# Patient Record
Sex: MALE | Race: WHITE | NOT HISPANIC OR LATINO | Employment: FULL TIME | URBAN - METROPOLITAN AREA
[De-identification: names, ages, dates, MRNs, and addresses within clinical notes are randomized per-mention and may not be internally consistent; named-entity substitution may affect disease eponyms.]

---

## 2022-02-15 ENCOUNTER — OFFICE VISIT (OUTPATIENT)
Dept: FAMILY MEDICINE | Facility: CLINIC | Age: 67
End: 2022-02-15
Payer: COMMERCIAL

## 2022-02-15 VITALS
SYSTOLIC BLOOD PRESSURE: 151 MMHG | HEART RATE: 84 BPM | DIASTOLIC BLOOD PRESSURE: 88 MMHG | OXYGEN SATURATION: 96 % | TEMPERATURE: 98.6 F | WEIGHT: 274 LBS | RESPIRATION RATE: 16 BRPM | BODY MASS INDEX: 37.16 KG/M2

## 2022-02-15 DIAGNOSIS — Z87.19 HISTORY OF DIVERTICULITIS: ICD-10-CM

## 2022-02-15 DIAGNOSIS — R10.32 ABDOMINAL PAIN, LEFT LOWER QUADRANT: Primary | ICD-10-CM

## 2022-02-15 LAB
BASOPHILS # BLD AUTO: 0.1 10E3/UL (ref 0–0.2)
BASOPHILS NFR BLD AUTO: 1 %
EOSINOPHIL # BLD AUTO: 0.2 10E3/UL (ref 0–0.7)
EOSINOPHIL NFR BLD AUTO: 2 %
ERYTHROCYTE [DISTWIDTH] IN BLOOD BY AUTOMATED COUNT: 13.1 % (ref 10–15)
HCT VFR BLD AUTO: 43.9 % (ref 40–53)
HGB BLD-MCNC: 14.4 G/DL (ref 13.3–17.7)
IMM GRANULOCYTES # BLD: 0 10E3/UL
IMM GRANULOCYTES NFR BLD: 0 %
LYMPHOCYTES # BLD AUTO: 2.1 10E3/UL (ref 0.8–5.3)
LYMPHOCYTES NFR BLD AUTO: 17 %
MCH RBC QN AUTO: 29.6 PG (ref 26.5–33)
MCHC RBC AUTO-ENTMCNC: 32.8 G/DL (ref 31.5–36.5)
MCV RBC AUTO: 90 FL (ref 78–100)
MONOCYTES # BLD AUTO: 0.9 10E3/UL (ref 0–1.3)
MONOCYTES NFR BLD AUTO: 8 %
NEUTROPHILS # BLD AUTO: 9 10E3/UL (ref 1.6–8.3)
NEUTROPHILS NFR BLD AUTO: 73 %
PLATELET # BLD AUTO: 300 10E3/UL (ref 150–450)
RBC # BLD AUTO: 4.86 10E6/UL (ref 4.4–5.9)
WBC # BLD AUTO: 12.3 10E3/UL (ref 4–11)

## 2022-02-15 PROCEDURE — 36415 COLL VENOUS BLD VENIPUNCTURE: CPT | Performed by: PHYSICIAN ASSISTANT

## 2022-02-15 PROCEDURE — 99213 OFFICE O/P EST LOW 20 MIN: CPT | Performed by: PHYSICIAN ASSISTANT

## 2022-02-15 PROCEDURE — 85025 COMPLETE CBC W/AUTO DIFF WBC: CPT | Performed by: PHYSICIAN ASSISTANT

## 2022-02-15 RX ORDER — LISINOPRIL 40 MG/1
TABLET ORAL
COMMUNITY
Start: 2022-02-01

## 2022-02-15 RX ORDER — AMLODIPINE BESYLATE 5 MG/1
5 TABLET ORAL
COMMUNITY
Start: 2021-10-11

## 2022-02-15 RX ORDER — ATORVASTATIN CALCIUM 40 MG/1
TABLET, FILM COATED ORAL
COMMUNITY
Start: 2022-02-01

## 2022-02-15 NOTE — PATIENT INSTRUCTIONS
Take the antibiotic as written.  Monitor your symptoms over the next 48 hours  If you have worsening symptoms to include increase left lower quadrant abdominal pain, fever, vomiting, diarrhea or blood or mucus in the stool return to the emergency room if it is after hours or follow-up immediately for evaluation and treatment.      Patient Education     Discharge Instructions for Diverticulitis   You have been diagnosed with diverticulitis. This is a condition in which small pouches form in your colon (large intestine) and become inflamed or infected. Follow the guidelines below for home care.  As you recover  Tips for recovery include:    Eat a low-fiber diet at first while you recover. Your healthcare provider may advise a liquid diet. This gives your bowel a chance to rest so that it can recover.    Foods to include: flake cereal, mashed potatoes, pancakes, waffles, pasta, white bread, rice, applesauce, bananas, eggs, fish, poultry, tofu, and well-cooked vegetables    Take your medicines as directed. Don't stop taking the medicines, even if you feel better.    Monitor your temperature and report any rise in temperature to your healthcare provider.    Take antibiotics exactly as directed. Don't miss any and keep taking them even if you feel better.     Drink 6 to 8 glasses of water every day, unless told otherwise.    Use a heating pad or hot water bottle to reduce abdominal cramping or pain.  Preventing diverticulitis in the future  Tips for prevention include:    Eat a high-fiber diet. Fiber adds bulk to the stool so that it passes through the large intestine more easily.    Keep drinking 6 to 8 glasses of water every day, unless told otherwise.    Start an exercise program. Ask your healthcare provider how to get started. You can benefit from simple activities such as walking or gardening.    Treat diarrhea with a bland diet. Start with liquids only, then slowly add fiber over time.    Watch for changes in your  bowel movements (constipation to diarrhea).    Prevent constipation with fiber and add a stool softener if needed.     Get plenty of rest and sleep.  Follow-up care  Make a follow-up appointment, or as advised. Your provider may recommend a colonoscopy or other imaging test of your colon.  When to call your healthcare provider  Call your healthcare provider immediately if you have any of the following:    Fever of 100.4 F (38.0 C) or higher, or as directed by your healthcare provider    Chills    Severe cramps in the belly, most commonly the lower left side    Tenderness in the belly, most commonly the lower left side    Nausea and vomiting    Bleeding from your rectum  [employer] last reviewed this educational content on 6/1/2019 2000-2021 [employer]. All rights reserved. This information is not intended as a substitute for professional medical care. Always follow your healthcare professional's instructions.           Patient Education     Diet for Diverticular Disease  What is diverticular disease?   When the inner wall of your colon weakens and forms small pouches, you have diverticulosis. For most people, this causes no symptoms.   If the pouches become inflamed or infected, you have diverticulitis. Warning signs may include pain in the lower abdomen, chills and vomiting (throwing up).  These conditions are called diverticular disease.  What causes it?  The cause has been linked to many years of eating too little fiber. People who eat plenty of whole grains, fresh fruits and vegetables are less likely to get this disease.   Once the pouches have formed, there is no way to reverse them.   How much fiber should I get?  If you're healing from diverticulitis or surgery to remove the inflamed area, you will at first follow a low-fiber diet (less than 10 grams each day). Then, as your doctor advises, you may slowly add fiber. Increase your intake by 1 to 2 grams a day. Your goal will be 25 to 30 grams  a day.   To avoid future problems, continue to get 25 to 30 grams of fiber each day.   How can fiber help?   A high-fiber diet will help you have regular bowel movements. Fiber adds bulk to the stool and helps it pass more easily. Fiber also helps prevent the pouches from growing larger or getting infected.  In the past, doctors have warned patients to avoid eating nuts, seeds and popcorn. They believed these foods could get caught in the pouches and inflame them. But recent studies do not support this idea.   Please ask your dietitian for the handout Fiber Content in Common Foods. It will show you how to get more fiber in your diet.  For informational purposes only. Not to replace the advice of your health care provider. Copyright   2007 [employer]. All rights reserved. Clinically reviewed by [employer]. [place] [id number] - REV 09/19.

## 2022-02-15 NOTE — PROGRESS NOTES
Patient presents with:  Abdominal Pain: possible diverticulitis      Clinical Decision Making:  A conversation with patient stating I do think that he has a history of diverticulitis.  He has not had current diarrhea or fever but he has had left lower quadrant abdominal pain.  Pain is nonsevere and is a 2 out of 10 and does not wake him up at night.  Patient's last bowel movement was soft and formed and there was no blood or mucus in the stools.  Patient declined CT scan for the abdomen but a CBC was obtained looking for a white count.  Patient be treated empirically with Augmentin with indication for return to clinic.  He will follow up with his primary care provider in 1 week for reevaluation and treatment.  Questions were answered patient satisfaction before discharge.      ICD-10-CM    1. Abdominal pain, left lower quadrant  R10.32 amoxicillin-clavulanate (AUGMENTIN) 875-125 MG tablet   2. History of diverticulitis  Z87.19 amoxicillin-clavulanate (AUGMENTIN) 875-125 MG tablet       Patient Instructions   Take the antibiotic as written.  Monitor your symptoms over the next 48 hours  If you have worsening symptoms to include increase left lower quadrant abdominal pain, fever, vomiting, diarrhea or blood or mucus in the stool return to the emergency room if it is after hours or follow-up immediately for evaluation and treatment.      Patient Education     Discharge Instructions for Diverticulitis   You have been diagnosed with diverticulitis. This is a condition in which small pouches form in your colon (large intestine) and become inflamed or infected. Follow the guidelines below for home care.  As you recover  Tips for recovery include:    Eat a low-fiber diet at first while you recover. Your healthcare provider may advise a liquid diet. This gives your bowel a chance to rest so that it can recover.    Foods to include: flake cereal, mashed potatoes, pancakes, waffles, pasta, white bread, rice, applesauce,  bananas, eggs, fish, poultry, tofu, and well-cooked vegetables    Take your medicines as directed. Don't stop taking the medicines, even if you feel better.    Monitor your temperature and report any rise in temperature to your healthcare provider.    Take antibiotics exactly as directed. Don't miss any and keep taking them even if you feel better.     Drink 6 to 8 glasses of water every day, unless told otherwise.    Use a heating pad or hot water bottle to reduce abdominal cramping or pain.  Preventing diverticulitis in the future  Tips for prevention include:    Eat a high-fiber diet. Fiber adds bulk to the stool so that it passes through the large intestine more easily.    Keep drinking 6 to 8 glasses of water every day, unless told otherwise.    Start an exercise program. Ask your healthcare provider how to get started. You can benefit from simple activities such as walking or gardening.    Treat diarrhea with a bland diet. Start with liquids only, then slowly add fiber over time.    Watch for changes in your bowel movements (constipation to diarrhea).    Prevent constipation with fiber and add a stool softener if needed.     Get plenty of rest and sleep.  Follow-up care  Make a follow-up appointment, or as advised. Your provider may recommend a colonoscopy or other imaging test of your colon.  When to call your healthcare provider  Call your healthcare provider immediately if you have any of the following:    Fever of 100.4 F (38.0 C) or higher, or as directed by your healthcare provider    Chills    Severe cramps in the belly, most commonly the lower left side    Tenderness in the belly, most commonly the lower left side    Nausea and vomiting    Bleeding from your rectum  [facility] last reviewed this educational content on 6/1/2019 2000-2021 [employer]. All rights reserved. This information is not intended as a substitute for professional medical care. Always follow your healthcare  professional's instructions.           Patient Education     Diet for Diverticular Disease  What is diverticular disease?   When the inner wall of your colon weakens and forms small pouches, you have diverticulosis. For most people, this causes no symptoms.   If the pouches become inflamed or infected, you have diverticulitis. Warning signs may include pain in the lower abdomen, chills and vomiting (throwing up).  These conditions are called diverticular disease.  What causes it?  The cause has been linked to many years of eating too little fiber. People who eat plenty of whole grains, fresh fruits and vegetables are less likely to get this disease.   Once the pouches have formed, there is no way to reverse them.   How much fiber should I get?  If you're healing from diverticulitis or surgery to remove the inflamed area, you will at first follow a low-fiber diet (less than 10 grams each day). Then, as your doctor advises, you may slowly add fiber. Increase your intake by 1 to 2 grams a day. Your goal will be 25 to 30 grams a day.   To avoid future problems, continue to get 25 to 30 grams of fiber each day.   How can fiber help?   A high-fiber diet will help you have regular bowel movements. Fiber adds bulk to the stool and helps it pass more easily. Fiber also helps prevent the pouches from growing larger or getting infected.  In the past, doctors have warned patients to avoid eating nuts, seeds and popcorn. They believed these foods could get caught in the pouches and inflame them. But recent studies do not support this idea.   Please ask your dietitian for the handout Fiber Content in Common Foods. It will show you how to get more fiber in your diet.  For informational purposes only. Not to replace the advice of your health care provider. Copyright   2007 [facility]. All rights reserved. Clinically reviewed by [employer]. [place] [id number] - REV 09/19.               HPI:  Neel Morales is a  66 year old male who presents today for a 3-day history of left lower quadrant abdominal pain.  Patient did not have blood or mucus in the stools no diarrheal stools but has had soft and formed stools.  Pain has been over the last 3 days but has not kept him awake at night and is only a 2 out of 10 pain.  He has not had associated vomiting or loss of appetite.  He has been eating normally and eliminating well.  She has had a history of diverticulitis and would like to have treatment for his symptoms.     History obtained from chart review and the patient.    Problem List:  There are no relevant problems documented for this patient.      No past medical history on file.    Social History     Tobacco Use     Smoking status: Current Every Day Smoker     Packs/day: 1.00     Years: 35.00     Pack years: 35.00     Types: Cigarettes     Smokeless tobacco: Never Used   Substance Use Topics     Alcohol use: Not on file       Review of Systems  As above in HPI otherwise negative.    Vitals:    02/15/22 1540   BP: (!) 151/88   BP Location: Left arm   Patient Position: Sitting   Cuff Size: Adult Large   Pulse: 84   Resp: 16   Temp: 98.6  F (37  C)   TempSrc: Oral   SpO2: 96%   Weight: 124.3 kg (274 lb)       General: Patient is resting comfortably no acute distress is afebrile  HEENT: Head is normocephalic atraumatic   eyes are PERRL EOMI sclera anicteric   TMs are clear bilaterally  Throat is with mild pharyngeal wall erythema and no exudate  No cervical lymphadenopathy present  LUNGS: Clear to auscultation bilaterally  HEART: Regular rate and rhythm  Abdomen: Left lower quadrant abdominal pain to palpation.  Negative pain at McBurney's point.  Negative Rovsing sign.  Skin: Without rash non-diaphoretic    Physical Exam    Labs:  CBC is pending.    Radiology:  CT of the abdomen was declined by the patient.    At the end of the encounter, I discussed results, diagnosis, medications. Discussed red flags for immediate return to  clinic/ER, as well as indications for follow up if no improvement. Patient understood and agreed to plan. Patient was stable for discharge.

## 2022-10-12 ENCOUNTER — OFFICE VISIT (OUTPATIENT)
Dept: FAMILY MEDICINE | Facility: CLINIC | Age: 67
End: 2022-10-12
Payer: COMMERCIAL

## 2022-10-12 VITALS
BODY MASS INDEX: 35.4 KG/M2 | HEART RATE: 107 BPM | WEIGHT: 261 LBS | TEMPERATURE: 98.6 F | RESPIRATION RATE: 18 BRPM | DIASTOLIC BLOOD PRESSURE: 87 MMHG | SYSTOLIC BLOOD PRESSURE: 143 MMHG | OXYGEN SATURATION: 97 %

## 2022-10-12 DIAGNOSIS — J10.1 INFLUENZA B: Primary | ICD-10-CM

## 2022-10-12 LAB
DEPRECATED S PYO AG THROAT QL EIA: NEGATIVE
FLUAV AG SPEC QL IA: NEGATIVE
FLUBV AG SPEC QL IA: POSITIVE
GROUP A STREP BY PCR: NOT DETECTED
SARS-COV-2 RNA RESP QL NAA+PROBE: NEGATIVE

## 2022-10-12 PROCEDURE — 87651 STREP A DNA AMP PROBE: CPT | Performed by: PHYSICIAN ASSISTANT

## 2022-10-12 PROCEDURE — U0003 INFECTIOUS AGENT DETECTION BY NUCLEIC ACID (DNA OR RNA); SEVERE ACUTE RESPIRATORY SYNDROME CORONAVIRUS 2 (SARS-COV-2) (CORONAVIRUS DISEASE [COVID-19]), AMPLIFIED PROBE TECHNIQUE, MAKING USE OF HIGH THROUGHPUT TECHNOLOGIES AS DESCRIBED BY CMS-2020-01-R: HCPCS | Performed by: PHYSICIAN ASSISTANT

## 2022-10-12 PROCEDURE — U0005 INFEC AGEN DETEC AMPLI PROBE: HCPCS | Performed by: PHYSICIAN ASSISTANT

## 2022-10-12 PROCEDURE — 99213 OFFICE O/P EST LOW 20 MIN: CPT | Mod: CS | Performed by: PHYSICIAN ASSISTANT

## 2022-10-12 PROCEDURE — 87804 INFLUENZA ASSAY W/OPTIC: CPT | Performed by: PHYSICIAN ASSISTANT

## 2022-10-12 RX ORDER — ONDANSETRON 4 MG/1
4 TABLET, ORALLY DISINTEGRATING ORAL EVERY 8 HOURS PRN
Qty: 10 TABLET | Refills: 0 | Status: SHIPPED | OUTPATIENT
Start: 2022-10-12

## 2022-10-12 RX ORDER — OSELTAMIVIR PHOSPHATE 75 MG/1
75 CAPSULE ORAL 2 TIMES DAILY
Qty: 10 CAPSULE | Refills: 0 | Status: SHIPPED | OUTPATIENT
Start: 2022-10-12 | End: 2022-10-17

## 2022-10-12 NOTE — PROGRESS NOTES
Assessment & Plan:      Problem List Items Addressed This Visit    None  Visit Diagnoses     Influenza B    -  Primary    Relevant Medications    oseltamivir (TAMIFLU) 75 MG capsule    ondansetron (ZOFRAN ODT) 4 MG ODT tab    Other Relevant Orders    Influenza A & B Antigen - Clinic Collect (Completed)    Streptococcus A Rapid Screen w/Reflex to PCR - Clinic Collect (Completed)    Symptomatic; Yes; 10/9/2022 COVID-19 Virus (Coronavirus) by PCR Nose    Group A Streptococcus PCR Throat Swab        Medical Decision Making  Patient presents with 2 to 3 days of fatigue, headache, sore throat, and cough.  Patient is positive for influenza B.  Recommend trial of Zofran to help with nausea.  Also treat with Tamiflu.  Continue with fluids, rest, honey, and over-the-counter analgesics as needed.  Allergies and medication interactions reviewed.  Discussed signs of worsening symptoms and when to follow-up with PCP if no symptom improvement.     Subjective:      Neel Morales is a 67 year old male here for evaluation of fatigue, headache, slight sore throat, and cough.  Onset of symptoms was 2 to 3 days ago.  Patient also notes subjective fevers and chills.  No shortness of breath.     The following portions of the patient's history were reviewed and updated as appropriate: allergies, current medications, and problem list.     Review of Systems  Pertinent items are noted in HPI.    Allergies  No Known Allergies    No family history on file.    Social History     Tobacco Use     Smoking status: Every Day     Packs/day: 1.00     Years: 35.00     Pack years: 35.00     Types: Cigarettes     Smokeless tobacco: Never   Substance Use Topics     Alcohol use: Not on file        Objective:      BP (!) 143/87   Pulse 107   Temp 98.6  F (37  C) (Oral)   Resp 18   Wt 118.4 kg (261 lb)   SpO2 97%   BMI 35.40 kg/m    General appearance - alert, well appearing, and in no distress and non-toxic  Ears - bilateral TM's and external ear  canals normal  Nose - normal and patent, no erythema, discharge or polyps  Mouth - mucous membranes moist, pharynx normal without lesions  Neck - supple, no significant adenopathy  Chest - clear to auscultation, no wheezes, rales or rhonchi, symmetric air entry  Heart - normal rate, regular rhythm, normal S1, S2, no murmurs, rubs, clicks or gallops     Lab & Imaging Results    Results for orders placed or performed in visit on 10/12/22   Influenza A & B Antigen - Clinic Collect     Status: Abnormal    Specimen: Nose; Swab   Result Value Ref Range    Influenza A antigen Negative Negative    Influenza B antigen Positive (A) Negative    Narrative    Test results must be correlated with clinical data. If necessary, results should be confirmed by a molecular assay or viral culture.   Streptococcus A Rapid Screen w/Reflex to PCR - Clinic Collect     Status: Normal    Specimen: Throat; Swab   Result Value Ref Range    Group A Strep antigen Negative Negative       I personally reviewed these results and discussed findings with the patient.    The use of [employer] dictation services was used to construct the content of this note; any grammatical errors are non-intentional. Please contact the author directly if you are in need of any clarification.

## 2022-10-12 NOTE — PATIENT INSTRUCTIONS
Cough    You were seen today for a cough.    Symptom management:  - Drink plenty of non-caffeinated fluids  - Avoid smoke exposure  - May use tylenol or ibuprofen for discomfort  - Drink a warm non-caffeinated tea with honey  - Place a warm humidifier in your bedroom at night  - [employer]'s VaporRub    Reasons to return for re-evaluation:  - Develop a fever 100.4 or higher, current fever worsens, or fever does not improve in 72 hours  - Difficulty breathing or shortness of breath  - Cough continues to worsen including coughing up blood or coughing up thick, colored phlegm  - Unable to tolerate fluids    Otherwise, if symptoms have not improved in 7 days, follow-up with your primary care provider.

## 2022-10-12 NOTE — LETTER
[facility]  [address] [address]  Phone: [phone]  Fax: [phone]    October 12, 2022        Neel Morales  [address]   [place] [address]          To whom it may concern:    RE: Neel Morales    He is excused from work until COVID-19 results return.  If negative, may return as long as no fevers of 100.4 or higher.  If COVID-19 test is positive, must wait 10 days from symptom onset (10/9/2022), as well as no fevers and good improvement of symptoms.    Please contact me for questions or concerns.      Sincerely,        Chuck Guerin PA-C

## 2022-10-16 ENCOUNTER — OFFICE VISIT (OUTPATIENT)
Dept: FAMILY MEDICINE | Facility: CLINIC | Age: 67
End: 2022-10-16
Payer: COMMERCIAL

## 2022-10-16 VITALS
WEIGHT: 256 LBS | OXYGEN SATURATION: 96 % | TEMPERATURE: 98.7 F | BODY MASS INDEX: 34.72 KG/M2 | DIASTOLIC BLOOD PRESSURE: 78 MMHG | RESPIRATION RATE: 16 BRPM | SYSTOLIC BLOOD PRESSURE: 137 MMHG | HEART RATE: 53 BPM

## 2022-10-16 DIAGNOSIS — J10.1 INFLUENZA B: Primary | ICD-10-CM

## 2022-10-16 DIAGNOSIS — Z02.89 ENCOUNTER TO OBTAIN EXCUSE FROM WORK: ICD-10-CM

## 2022-10-16 PROCEDURE — 99213 OFFICE O/P EST LOW 20 MIN: CPT | Performed by: PHYSICIAN ASSISTANT

## 2022-10-16 NOTE — LETTER
[facility]  [address] [address]  Phone: [phone]  Fax: [phone]    October 16, 2022        Neel Morales  [address]   [place] [address]          To whom it may concern:    RE: Neel Morales    Patient was seen and treated today at our clinic and missed work.  Patient may return to work 10/19/22 with the following:  No restrictions    Please contact me for questions or concerns.      Sincerely,        Jono Boogie PA-C

## 2022-10-16 NOTE — PATIENT INSTRUCTIONS
Continue to take the Tamiflu until completed.  Use of oral rehydration therapy drinks and water to 96 ounces of water per day.  Return to see your primary care provider if not getting good resolution of new symptoms or concerns arise.

## 2022-10-16 NOTE — PROGRESS NOTES
Patient presents with:  Patient Request for Note/Letter: Was dx with influenza feels like he is not ready to go back to work was not able to eat      Clinical Decision Making:  Patient presents to clinic today ostensibly for a work note.  Patient had been diagnosed with influenza and has been using the Zofran and Tamiflu and is on day 5 of the Tamiflu.  He has had difficulty with keeping food down.  He has had 12 ounces of water today.  He is urinated twice.  Patient was told he should have increased fluid intake with water to 96 ounces.  Avoid caffeine alcohol and nicotine.  Patient will have 2 more days off work to recover with his dehydration and sequela from flu.  Returning to work on 1019.  Work note is written.  Questions were answered to patient's satisfaction before discharge      ICD-10-CM    1. Influenza B  J10.1       2. Encounter to obtain excuse from work  Z02.89           Patient Instructions   Continue to take the Tamiflu until completed.  Use of oral rehydration therapy drinks and water to 96 ounces of water per day.  Return to see your primary care provider if not getting good resolution of new symptoms or concerns arise.      HPI:  Neel Morales is a 67 year old male who presents today ostensibly for a work note.  Patient states that he is feeling weak and fatigued and is still having vomiting and diarrhea from the flu.  Patient is on Tamiflu day 5.  Patient has had 12 ounces of water today and urinated twice.  He does have fatigue and headache as his primary sequela.  He is continue to have vomiting and diarrhea and has been using the Zofran    History obtained from chart review and the patient.    Problem List:  There are no relevant problems documented for this patient.      History reviewed. No pertinent past medical history.    Social History     Tobacco Use     Smoking status: Every Day     Packs/day: 1.00     Years: 35.00     Pack years: 35.00     Types: Cigarettes     Smokeless tobacco:  Never   Substance Use Topics     Alcohol use: Not on file       Review of Systems  As above in HPI otherwise negative.    Vitals:    10/16/22 1046   BP: 137/78   Pulse: 53   Resp: 16   Temp: 98.7  F (37.1  C)   TempSrc: Oral   SpO2: 96%   Weight: 116.1 kg (256 lb)       General: Patient is resting comfortably no acute distress is afebrile  HEENT: Head is normocephalic atraumatic   eyes are PERRL EOMI sclera anicteric   TMs are clear bilaterally  Throat is clear  No cervical lymphadenopathy present  LUNGS: Clear to auscultation bilaterally  HEART: Regular rate and rhythm  Skin: Without rash non-diaphoretic    Physical Exam    At the end of the encounter, I discussed results, diagnosis, medications. Discussed red flags for immediate return to clinic/ER, as well as indications for follow up if no improvement. Patient understood and agreed to plan. Patient was stable for discharge.

## 2023-03-24 ENCOUNTER — OFFICE VISIT (OUTPATIENT)
Dept: FAMILY MEDICINE | Facility: CLINIC | Age: 68
End: 2023-03-24
Payer: COMMERCIAL

## 2023-03-24 VITALS
TEMPERATURE: 98.1 F | HEART RATE: 85 BPM | RESPIRATION RATE: 16 BRPM | SYSTOLIC BLOOD PRESSURE: 143 MMHG | BODY MASS INDEX: 36.08 KG/M2 | OXYGEN SATURATION: 96 % | WEIGHT: 266 LBS | DIASTOLIC BLOOD PRESSURE: 81 MMHG

## 2023-03-24 DIAGNOSIS — R07.0 THROAT PAIN: ICD-10-CM

## 2023-03-24 DIAGNOSIS — J06.9 VIRAL URI: ICD-10-CM

## 2023-03-24 DIAGNOSIS — J02.9 SORE THROAT: Primary | ICD-10-CM

## 2023-03-24 LAB
DEPRECATED S PYO AG THROAT QL EIA: NEGATIVE
FLUAV AG SPEC QL IA: NEGATIVE
FLUBV AG SPEC QL IA: NEGATIVE
GROUP A STREP BY PCR: NOT DETECTED

## 2023-03-24 PROCEDURE — 87804 INFLUENZA ASSAY W/OPTIC: CPT | Performed by: EMERGENCY MEDICINE

## 2023-03-24 PROCEDURE — U0005 INFEC AGEN DETEC AMPLI PROBE: HCPCS | Performed by: EMERGENCY MEDICINE

## 2023-03-24 PROCEDURE — 99213 OFFICE O/P EST LOW 20 MIN: CPT | Mod: CS | Performed by: EMERGENCY MEDICINE

## 2023-03-24 PROCEDURE — 87651 STREP A DNA AMP PROBE: CPT | Performed by: EMERGENCY MEDICINE

## 2023-03-24 PROCEDURE — U0003 INFECTIOUS AGENT DETECTION BY NUCLEIC ACID (DNA OR RNA); SEVERE ACUTE RESPIRATORY SYNDROME CORONAVIRUS 2 (SARS-COV-2) (CORONAVIRUS DISEASE [COVID-19]), AMPLIFIED PROBE TECHNIQUE, MAKING USE OF HIGH THROUGHPUT TECHNOLOGIES AS DESCRIBED BY CMS-2020-01-R: HCPCS | Performed by: EMERGENCY MEDICINE

## 2023-03-24 NOTE — PROGRESS NOTES
Impression:  Viral URI with negative strep and negative influenza.  COVID is pending    Plan:  Tylenol, fluids, rest, await results of confirmatory strep and COVID testing      Chief Complaint:  Patient presents with:  Throat Problem: Has sore throat and fatigue for 1 week         HPI:   Neel Morales is a 67 year old male who presents to this clinic for the evaluation of sore throat and fatigue for the past 1 week.  No fever.  No cough.  No nausea or vomiting.  No rash.      PMH:   No past medical history on file.  No past surgical history on file.      ROS:  All other systems negative    Meds:    Current Outpatient Medications:      amLODIPine (NORVASC) 5 MG tablet, Take 5 mg by mouth, Disp: , Rfl:      atorvastatin (LIPITOR) 40 MG tablet, , Disp: , Rfl:      lisinopril (ZESTRIL) 40 MG tablet, , Disp: , Rfl:      ibuprofen (ADVIL,MOTRIN) 200 MG tablet, [IBUPROFEN (ADVIL,MOTRIN) 200 MG TABLET] Take 200 mg by mouth every 6 (six) hours as needed for pain. (Patient not taking: Reported on 10/12/2022), Disp: , Rfl:      ondansetron (ZOFRAN ODT) 4 MG ODT tab, Take 1 tablet (4 mg) by mouth every 8 hours as needed for nausea (Patient not taking: Reported on 3/24/2023), Disp: 10 tablet, Rfl: 0        Social:  Social History     Socioeconomic History     Marital status:      Spouse name: Not on file     Number of children: Not on file     Years of education: Not on file     Highest education level: Not on file   Occupational History     Not on file   Tobacco Use     Smoking status: Every Day     Packs/day: 1.00     Years: 35.00     Pack years: 35.00     Types: Cigarettes     Smokeless tobacco: Never   Substance and Sexual Activity     Alcohol use: Not on file     Drug use: Not on file     Sexual activity: Not on file   Other Topics Concern     Not on file   Social History Narrative     Not on file     Social Determinants of Health     Financial Resource Strain: Not on file   Food Insecurity: Not on file    Transportation Needs: Not on file   Physical Activity: Not on file   Stress: Not on file   Social Connections: Not on file   Intimate Partner Violence: Not on file   Housing Stability: Not on file         Physical Exam:  Vitals:    03/24/23 1009   BP: (!) 143/81   Pulse: 85   Resp: 16   Temp: 98.1  F (36.7  C)   TempSrc: Oral   SpO2: 96%   Weight: 120.7 kg (266 lb)      Patient is awake, alert, no distress  Eyes: PERRL, EOMI  Head: Atraumatic and normocephalic  Pharynx: Erythema, no exudate, airway patent  Neck: No mass or tenderness  Lungs: Clear without distress  Skin: No lesions or rash  Neuro: Normal motor and sensory function in all extremities  Psych: Awake, alert, normally responsive      Results:    Results for orders placed or performed in visit on 03/24/23 (from the past 24 hour(s))   Streptococcus A Rapid Screen w/Reflex to PCR - Clinic Collect    Specimen: Throat; Swab   Result Value Ref Range    Group A Strep antigen Negative Negative   Influenza A & B Antigen    Specimen: Nose; Swab   Result Value Ref Range    Influenza A antigen Negative Negative    Influenza B antigen Negative Negative    Narrative    Test results must be correlated with clinical data. If necessary, results should be confirmed by a molecular assay or viral culture.              Jono Rangel MD

## 2023-03-25 ENCOUNTER — TELEPHONE (OUTPATIENT)
Dept: INTERNAL MEDICINE | Facility: CLINIC | Age: 68
End: 2023-03-25
Payer: COMMERCIAL

## 2023-03-25 ENCOUNTER — NURSE TRIAGE (OUTPATIENT)
Dept: NURSING | Facility: CLINIC | Age: 68
End: 2023-03-25
Payer: COMMERCIAL

## 2023-03-25 LAB — SARS-COV-2 RNA RESP QL NAA+PROBE: NEGATIVE

## 2023-03-25 NOTE — TELEPHONE ENCOUNTER
Pt calling for results of strep, influenza and COVID tests from 3/24/23.  Results negative for strep and influenza given to pt, COVID result pending - pt will call back later for result.  Dafne Oliveira RN  [facility] Nurse Advisor

## 2023-03-25 NOTE — TELEPHONE ENCOUNTER
"Coronavirus (COVID-19) Notification    SARS CoV2 PCR (no units)   Date Value   03/24/2023 Negative         Your result was negative. This means that we didn't find the virus that causes COVID-19 in your sample. A test may show negative when you do actually have the virus. This can happen when the virus is in the early stages of infection, before you feel illness symptoms.    If you were exposed to COVID-19: Follow the CDC's instructions for quarantine:   www.cdc.gov/coronavirus/2019-ncov/your-health/quarantine-isolation.html     If you have symptoms     Limit contact with others to avoid spreading the illness    Clean \"high-touch\" surfaces often (doorknobs, counters, handles etc.)  o Use household cleaning spray or wipes. You can find a full list on the EPA website: www.epa.gov/pesticide-registration/list-n-disinfectants-use-against-sars-cov-2  o Cover your mouth and nose with a mask or other face covering to avoid spreading germs  o Wash your hands and face often with soap and water  o If symptoms get worse, or you still think you might have COVID, you may want to get tested again in 48 hours    Going back to work, school or   Check with your employer, school or  for any guidelines to follow for returning.  You are sent a letter which will serve as formal document notice that you tested negative for COVID-19, as of the testing date shown above.    Gregg carty"

## 2023-03-25 NOTE — TELEPHONE ENCOUNTER
Please disregard.  Addendum to earlier call of today.    Bela Rodriguez RN, Nurse Advisor 4:28 PM 3/25/2023

## 2023-03-25 NOTE — TELEPHONE ENCOUNTER
Pt returning call to triage line;    Reports that he 'can't believe nothing showed up on labs because he feel even worse today'    Chills   Lightheaded  Very sore throat (8-9/10)    Pt Denies;  Fever at time of this call  Difficulty breathing/SOB  Signs/symptoms of dehydration  Unable to swallow fluids    Pt states he will need to go to  if not feeling better on 3/26/23.    Bela Rodriguez RN, Nurse Advisor 5:13 PM 3/25/2023

## 2024-08-19 ENCOUNTER — OFFICE VISIT (OUTPATIENT)
Dept: FAMILY MEDICINE | Facility: CLINIC | Age: 69
End: 2024-08-19
Payer: COMMERCIAL

## 2024-08-19 VITALS
OXYGEN SATURATION: 97 % | HEART RATE: 83 BPM | RESPIRATION RATE: 18 BRPM | TEMPERATURE: 97.5 F | DIASTOLIC BLOOD PRESSURE: 85 MMHG | SYSTOLIC BLOOD PRESSURE: 158 MMHG

## 2024-08-19 DIAGNOSIS — K04.7 TOOTH INFECTION: Primary | ICD-10-CM

## 2024-08-19 PROCEDURE — 99213 OFFICE O/P EST LOW 20 MIN: CPT

## 2024-08-19 NOTE — PROGRESS NOTES
Assessment & Plan     Tooth infection  Tooth pain for 2 days duration.  On exam does have poor dentition with some swelling around the tooth and upper right jaw.  Will treat for an infection but most importantly have recommended patient go to the dentist which she will make a call afterwards today.  Discussed return precaution.  - amoxicillin-clavulanate (AUGMENTIN) 875-125 MG tablet; Take 1 tablet by mouth 2 times daily    Return if symptoms worsen or fail to improve.    Subjective   Deniz is a 68 year old, presenting for the following health issues:  Dental Pain (Since yesterday upper Right side  tooth pain )    HPI   Concern - Right sided tooth pain  Onset: 2 days  Description: Right upper jaw tooth pain for 2 days duration that is in the right upper side with associated swelling.  No fevers.  Has a history of poor dentition has not seen the dentist in several years.  Intensity: moderate  Progression of Symptoms:  worsening  Accompanying Signs & Symptoms: None other than pain  Previous history of similar problem: Yes, history of poor dentition  Precipitating factors:        Worsened by: Chewing  Alleviating factors:        Improved by: Tylenol and ibuprofen  Therapies tried and outcome: Tylenol and ibuprofen    Review of Systems  Constitutional, HEENT, cardiovascular, pulmonary, gi and gu systems are negative, except as otherwise noted.      Objective    BP (!) 158/85 (BP Location: Right arm, Patient Position: Sitting, Cuff Size: Adult Regular)   Pulse 83   Temp 97.5  F (36.4  C) (Oral)   Resp 18   SpO2 97%   There is no height or weight on file to calculate BMI.  Physical Exam   GENERAL: alert and no distress  HENT: normal cephalic/atraumatic, ear canals and TM's normal, and oropharynx clear. Right sided upper gum swelling and tooth pain  NECK: no adenopathy, no asymmetry, masses, or scars  RESP: lungs clear to auscultation - no rales, rhonchi or wheezes  CV: regular rate and rhythm, normal S1 S2, no S3 or  S4, no murmur, click or rub, no peripheral edema  ABDOMEN: soft, nontender, no hepatosplenomegaly, no masses and bowel sounds normal  MS: no gross musculoskeletal defects noted, no edema        Signed Electronically by: Martin Churchill DO

## 2025-03-22 ENCOUNTER — OFFICE VISIT (OUTPATIENT)
Dept: URGENT CARE | Facility: URGENT CARE | Age: 70
End: 2025-03-22
Payer: COMMERCIAL

## 2025-03-22 VITALS
HEART RATE: 66 BPM | WEIGHT: 230 LBS | TEMPERATURE: 97.7 F | RESPIRATION RATE: 18 BRPM | OXYGEN SATURATION: 96 % | SYSTOLIC BLOOD PRESSURE: 150 MMHG | DIASTOLIC BLOOD PRESSURE: 90 MMHG | HEIGHT: 72 IN | BODY MASS INDEX: 31.15 KG/M2

## 2025-03-22 DIAGNOSIS — K04.7 TOOTH INFECTION: Primary | ICD-10-CM

## 2025-03-22 PROCEDURE — 99213 OFFICE O/P EST LOW 20 MIN: CPT

## 2025-03-22 PROCEDURE — 3077F SYST BP >= 140 MM HG: CPT

## 2025-03-22 PROCEDURE — 3079F DIAST BP 80-89 MM HG: CPT

## 2025-03-22 RX ORDER — NAPROXEN 500 MG/1
500 TABLET ORAL 2 TIMES DAILY WITH MEALS
Qty: 10 TABLET | Refills: 0 | Status: SHIPPED | OUTPATIENT
Start: 2025-03-22

## 2025-03-22 NOTE — PROGRESS NOTES
Assessment & Plan     Tooth infection  Similar to previous episode in August. Vitally stable, no trismus or drooling. No abscess, bleeding, or purulent drainage. Will treat with Augment and pain control. Strongly encouraged follow up with the dentist. Information for Emergency [facility] Care provided in AVS.   - amoxicillin-clavulanate (AUGMENTIN) 875-125 MG tablet  Dispense: 14 tablet; Refill: 0  - naproxen (NAPROSYN) 500 MG tablet  Dispense: 10 tablet; Refill: 0  - magic mouthwash suspension (diphenhydrAMINE, lidocaine, aluminum-magnesium & simethicone)  Dispense: 100 mL; Refill: 0     Return if symptoms worsen or fail to improve.    [facility]    Chantal Guaman is a 69 year old male who presents to clinic today for the following health issues:  Chief Complaint   Patient presents with    Dental Problem     Ongoing issue but gotten worse yesterday day. Right sided gum infection. Right sided nasal pressure and discomfort.      HPI  Deniz is a 69 year old male who presents to urgent care today for evaluation of right sided mouth pain and concerns for infection.    Prior mouth infection in August 2024, which was helped by Augmentin.   Today's presenting with similar symptoms to his infection in August.  The symptoms started about 1 day ago.    Over the last day, having increased pain on right upper teeth, with pain into front teeth, nose.   Able to eat and drink on the other side.   No fevers, chills.   No drainage or purulence.   No bleeding.   Feels some swelling on the right side.     He unfortunately does not have a dentist yet.  Understands the importance of this.      Review of Systems  Constitutional, HEENT, cardiovascular, pulmonary, gi and gu systems are negative, except as otherwise noted.      Objective    BP (!) 150/90   Pulse 66   Temp 97.7  F (36.5  C) (Oral)   Resp 18   Ht 1.829 m (6')   Wt 104.3 kg (230 lb)   SpO2 96%   BMI 31.19 kg/m     Physical Exam   GENERAL: alert and no distress  HENT: Normocephalic, atraumatic, external ears normal, oropharynx clear, no drooling, no trismus, able to speak in complete sentences, Right sided upper gums with erythema and mild edema without purulence, bleeding, or abscess. Poor dentition  RESP: Breathing comfortably on room air, no signs of distress  CV: regular rate, acyanotic, no peripheral edema bilaterally  ABDOMEN: Non distended  MS: no gross musculoskeletal defects noted, no edema  SKIN: no suspicious lesions or rashes on exposed skin  NEURO: Normal strength and tone, mentation intact and speech normal  PSYCH: mentation appears normal, affect normal/bright

## 2025-03-22 NOTE — PATIENT INSTRUCTIONS
Here is the plan:    - Start your antibiotic  - Use Naproxen and Magic Mouthwash as needed for pain  - Follow up with a dentist as soon as possible    [facility]:  [email or web address]  [address]  [address],  [place] [address]  [phone]

## 2025-07-19 ENCOUNTER — OFFICE VISIT (OUTPATIENT)
Dept: URGENT CARE | Facility: URGENT CARE | Age: 70
End: 2025-07-19
Payer: COMMERCIAL

## 2025-07-19 VITALS
WEIGHT: 270.8 LBS | TEMPERATURE: 97.4 F | DIASTOLIC BLOOD PRESSURE: 80 MMHG | HEART RATE: 60 BPM | BODY MASS INDEX: 36.73 KG/M2 | SYSTOLIC BLOOD PRESSURE: 152 MMHG | RESPIRATION RATE: 16 BRPM | OXYGEN SATURATION: 97 %

## 2025-07-19 DIAGNOSIS — K04.7 TOOTH INFECTION: Primary | ICD-10-CM

## 2025-07-19 PROCEDURE — 99214 OFFICE O/P EST MOD 30 MIN: CPT | Performed by: PHYSICIAN ASSISTANT

## 2025-07-19 PROCEDURE — 3077F SYST BP >= 140 MM HG: CPT | Performed by: PHYSICIAN ASSISTANT

## 2025-07-19 PROCEDURE — 1125F AMNT PAIN NOTED PAIN PRSNT: CPT | Performed by: PHYSICIAN ASSISTANT

## 2025-07-19 PROCEDURE — 3078F DIAST BP <80 MM HG: CPT | Performed by: PHYSICIAN ASSISTANT

## 2025-07-19 RX ORDER — NAPROXEN 500 MG/1
500 TABLET ORAL 2 TIMES DAILY WITH MEALS
Qty: 30 TABLET | Refills: 0 | Status: SHIPPED | OUTPATIENT
Start: 2025-07-19 | End: 2025-08-02

## 2025-07-19 ASSESSMENT — PAIN SCALES - GENERAL: PAINLEVEL_OUTOF10: SEVERE PAIN (8)

## 2025-07-19 NOTE — PROGRESS NOTES
Assessment & Plan:      Problem List Items Addressed This Visit    None  Visit Diagnoses         Tooth infection    -  Primary    Relevant Medications    amoxicillin-clavulanate (AUGMENTIN) 875-125 MG tablet    naproxen (NAPROSYN) 500 MG tablet          Medical Decision Making  Patient presents with 1 to 2 days of severe tooth pain.  Symptoms are consistent with a tooth infection.  Recommend oral antibiotics and naproxen as needed for pain.  Stressed importance of following up with the dentist.     Subjective:      Neel Morales is a 69 year old male here for evaluation of tooth pains.  Onset of symptoms was yesterday.  This is now patient's third visit for tooth pains over the last year.  Patient admits that he has not been in to see a dentist and he has poor dentition throughout.     The following portions of the patient's history were reviewed and updated as appropriate: allergies, current medications, and problem list.     Review of Systems  Pertinent items are noted in HPI.    Allergies  No Known Allergies    No family history on file.    Social History     Tobacco Use    Smoking status: Every Day     Current packs/day: 1.00     Average packs/day: 1 pack/day for 35.0 years (35.0 ttl pk-yrs)     Types: Cigarettes    Smokeless tobacco: Never   Substance Use Topics    Alcohol use: Not on file        Objective:      BP (!) 152/80 (BP Location: Right arm, Patient Position: Sitting, Cuff Size: Adult Large)   Pulse 60   Temp 97.4  F (36.3  C) (Oral)   Resp 16   Wt 122.8 kg (270 lb 12.8 oz)   SpO2 97%   BMI 36.73 kg/m    General appearance - alert, well appearing, and in no distress and non-toxic  Mouth - poor dentition seen throughout, gingiva appears erythematous, no obvious signs of abscess or purulent discharge    The use of Dragon/[employer] dictation services was used to construct the content of this note; any grammatical errors are non-intentional. Please contact the author directly if you are in need of  any clarification.

## 2025-07-19 NOTE — PROGRESS NOTES
Urgent Care Clinic Visit    Chief Complaint   Patient presents with    Dental Pain     Few broken teeth-believes gums are infected, very painful              7/19/2025     3:49 PM   Additional Questions   Roomed by Jenifer Fernandez on 7/19/2025 at 3:53 PM